# Patient Record
Sex: MALE | Race: OTHER | HISPANIC OR LATINO | URBAN - METROPOLITAN AREA
[De-identification: names, ages, dates, MRNs, and addresses within clinical notes are randomized per-mention and may not be internally consistent; named-entity substitution may affect disease eponyms.]

---

## 2018-07-31 ENCOUNTER — INPATIENT (INPATIENT)
Facility: HOSPITAL | Age: 58
LOS: 1 days | Discharge: HOME | DRG: 897 | End: 2018-08-02
Attending: INTERNAL MEDICINE | Admitting: INTERNAL MEDICINE
Payer: COMMERCIAL

## 2018-07-31 VITALS
OXYGEN SATURATION: 96 % | HEIGHT: 62 IN | RESPIRATION RATE: 20 BRPM | DIASTOLIC BLOOD PRESSURE: 112 MMHG | TEMPERATURE: 98 F | WEIGHT: 160.06 LBS | HEART RATE: 111 BPM | SYSTOLIC BLOOD PRESSURE: 174 MMHG

## 2018-07-31 DIAGNOSIS — E78.5 HYPERLIPIDEMIA, UNSPECIFIED: ICD-10-CM

## 2018-07-31 DIAGNOSIS — E11.9 TYPE 2 DIABETES MELLITUS WITHOUT COMPLICATIONS: ICD-10-CM

## 2018-07-31 DIAGNOSIS — F10.239 ALCOHOL DEPENDENCE WITH WITHDRAWAL, UNSPECIFIED: ICD-10-CM

## 2018-07-31 DIAGNOSIS — F10.230 ALCOHOL DEPENDENCE WITH WITHDRAWAL, UNCOMPLICATED: ICD-10-CM

## 2018-07-31 DIAGNOSIS — Z29.9 ENCOUNTER FOR PROPHYLACTIC MEASURES, UNSPECIFIED: ICD-10-CM

## 2018-07-31 DIAGNOSIS — I10 ESSENTIAL (PRIMARY) HYPERTENSION: ICD-10-CM

## 2018-07-31 LAB
ALBUMIN SERPL ELPH-MCNC: 3.9 G/DL — SIGNIFICANT CHANGE UP (ref 3.5–5)
ALP SERPL-CCNC: 100 U/L — SIGNIFICANT CHANGE UP (ref 40–120)
ALT FLD-CCNC: 61 U/L DA — HIGH (ref 10–60)
ANION GAP SERPL CALC-SCNC: 11 MMOL/L — SIGNIFICANT CHANGE UP (ref 5–17)
APPEARANCE UR: CLEAR — SIGNIFICANT CHANGE UP
AST SERPL-CCNC: 65 U/L — HIGH (ref 10–40)
BASE EXCESS BLDV CALC-SCNC: 5.8 MMOL/L — HIGH (ref -2–2)
BILIRUB DIRECT SERPL-MCNC: 0.2 MG/DL — SIGNIFICANT CHANGE UP (ref 0–0.2)
BILIRUB INDIRECT FLD-MCNC: 0.9 MG/DL — SIGNIFICANT CHANGE UP (ref 0.2–1)
BILIRUB SERPL-MCNC: 1.1 MG/DL — SIGNIFICANT CHANGE UP (ref 0.2–1.2)
BILIRUB UR-MCNC: NEGATIVE — SIGNIFICANT CHANGE UP
BUN SERPL-MCNC: 9 MG/DL — SIGNIFICANT CHANGE UP (ref 7–18)
CALCIUM SERPL-MCNC: 8.9 MG/DL — SIGNIFICANT CHANGE UP (ref 8.4–10.5)
CHLORIDE SERPL-SCNC: 97 MMOL/L — SIGNIFICANT CHANGE UP (ref 96–108)
CO2 SERPL-SCNC: 29 MMOL/L — SIGNIFICANT CHANGE UP (ref 22–31)
COLOR SPEC: YELLOW — SIGNIFICANT CHANGE UP
CREAT SERPL-MCNC: 0.76 MG/DL — SIGNIFICANT CHANGE UP (ref 0.5–1.3)
DIFF PNL FLD: NEGATIVE — SIGNIFICANT CHANGE UP
ETHANOL SERPL-MCNC: <3 MG/DL — SIGNIFICANT CHANGE UP (ref 0–10)
GLUCOSE SERPL-MCNC: 286 MG/DL — HIGH (ref 70–99)
GLUCOSE UR QL: 1000 MG/DL
HCO3 BLDV-SCNC: 32 MMOL/L — HIGH (ref 21–29)
HCT VFR BLD CALC: 47.4 % — SIGNIFICANT CHANGE UP (ref 39–50)
HGB BLD-MCNC: 15.4 G/DL — SIGNIFICANT CHANGE UP (ref 13–17)
HOROWITZ INDEX BLDV+IHG-RTO: 21 — SIGNIFICANT CHANGE UP
KETONES UR-MCNC: ABNORMAL
LACTATE SERPL-SCNC: 1.6 MMOL/L — SIGNIFICANT CHANGE UP (ref 0.7–2)
LACTATE SERPL-SCNC: 4.7 MMOL/L — CRITICAL HIGH (ref 0.7–2)
LEUKOCYTE ESTERASE UR-ACNC: NEGATIVE — SIGNIFICANT CHANGE UP
LIDOCAIN IGE QN: 78 U/L — SIGNIFICANT CHANGE UP (ref 73–393)
MCHC RBC-ENTMCNC: 29 PG — SIGNIFICANT CHANGE UP (ref 27–34)
MCHC RBC-ENTMCNC: 32.5 GM/DL — SIGNIFICANT CHANGE UP (ref 32–36)
MCV RBC AUTO: 89.2 FL — SIGNIFICANT CHANGE UP (ref 80–100)
NITRITE UR-MCNC: NEGATIVE — SIGNIFICANT CHANGE UP
PCO2 BLDV: 56 MMHG — HIGH (ref 35–50)
PH BLDV: 7.38 — SIGNIFICANT CHANGE UP (ref 7.35–7.45)
PH UR: 8 — SIGNIFICANT CHANGE UP (ref 5–8)
PLATELET # BLD AUTO: 145 K/UL — LOW (ref 150–400)
PO2 BLDV: <47 MMHG — HIGH (ref 25–45)
POTASSIUM SERPL-MCNC: 4.8 MMOL/L — SIGNIFICANT CHANGE UP (ref 3.5–5.3)
POTASSIUM SERPL-SCNC: 4.8 MMOL/L — SIGNIFICANT CHANGE UP (ref 3.5–5.3)
PROT SERPL-MCNC: 8.2 G/DL — SIGNIFICANT CHANGE UP (ref 6–8.3)
PROT UR-MCNC: NEGATIVE — SIGNIFICANT CHANGE UP
RBC # BLD: 5.31 M/UL — SIGNIFICANT CHANGE UP (ref 4.2–5.8)
RBC # FLD: 11.5 % — SIGNIFICANT CHANGE UP (ref 10.3–14.5)
SAO2 % BLDV: 14 % — LOW (ref 67–88)
SODIUM SERPL-SCNC: 137 MMOL/L — SIGNIFICANT CHANGE UP (ref 135–145)
SP GR SPEC: 1.01 — SIGNIFICANT CHANGE UP (ref 1.01–1.02)
UROBILINOGEN FLD QL: NEGATIVE — SIGNIFICANT CHANGE UP
WBC # BLD: 15.1 K/UL — HIGH (ref 3.8–10.5)
WBC # FLD AUTO: 15.1 K/UL — HIGH (ref 3.8–10.5)

## 2018-07-31 PROCEDURE — 99223 1ST HOSP IP/OBS HIGH 75: CPT | Mod: AI,GC

## 2018-07-31 PROCEDURE — 74177 CT ABD & PELVIS W/CONTRAST: CPT | Mod: 26

## 2018-07-31 PROCEDURE — 99284 EMERGENCY DEPT VISIT MOD MDM: CPT

## 2018-07-31 PROCEDURE — 71046 X-RAY EXAM CHEST 2 VIEWS: CPT | Mod: 26

## 2018-07-31 RX ORDER — AZITHROMYCIN 500 MG/1
500 TABLET, FILM COATED ORAL ONCE
Qty: 0 | Refills: 0 | Status: COMPLETED | OUTPATIENT
Start: 2018-07-31 | End: 2018-07-31

## 2018-07-31 RX ORDER — AZITHROMYCIN 500 MG/1
TABLET, FILM COATED ORAL
Qty: 0 | Refills: 0 | Status: DISCONTINUED | OUTPATIENT
Start: 2018-07-31 | End: 2018-08-02

## 2018-07-31 RX ORDER — ONDANSETRON 8 MG/1
4 TABLET, FILM COATED ORAL ONCE
Qty: 0 | Refills: 0 | Status: COMPLETED | OUTPATIENT
Start: 2018-07-31 | End: 2018-07-31

## 2018-07-31 RX ORDER — THIAMINE MONONITRATE (VIT B1) 100 MG
500 TABLET ORAL ONCE
Qty: 0 | Refills: 0 | Status: COMPLETED | OUTPATIENT
Start: 2018-07-31 | End: 2018-07-31

## 2018-07-31 RX ORDER — SODIUM CHLORIDE 9 MG/ML
1000 INJECTION, SOLUTION INTRAVENOUS
Qty: 0 | Refills: 0 | Status: DISCONTINUED | OUTPATIENT
Start: 2018-07-31 | End: 2018-08-02

## 2018-07-31 RX ORDER — CEFTRIAXONE 500 MG/1
1 INJECTION, POWDER, FOR SOLUTION INTRAMUSCULAR; INTRAVENOUS ONCE
Qty: 0 | Refills: 0 | Status: COMPLETED | OUTPATIENT
Start: 2018-07-31 | End: 2018-07-31

## 2018-07-31 RX ORDER — INSULIN LISPRO 100/ML
VIAL (ML) SUBCUTANEOUS
Qty: 0 | Refills: 0 | Status: DISCONTINUED | OUTPATIENT
Start: 2018-07-31 | End: 2018-08-01

## 2018-07-31 RX ORDER — THIAMINE MONONITRATE (VIT B1) 100 MG
100 TABLET ORAL DAILY
Qty: 0 | Refills: 0 | Status: DISCONTINUED | OUTPATIENT
Start: 2018-07-31 | End: 2018-08-02

## 2018-07-31 RX ORDER — INSULIN GLARGINE 100 [IU]/ML
6 INJECTION, SOLUTION SUBCUTANEOUS AT BEDTIME
Qty: 0 | Refills: 0 | Status: DISCONTINUED | OUTPATIENT
Start: 2018-07-31 | End: 2018-08-02

## 2018-07-31 RX ORDER — SODIUM CHLORIDE 9 MG/ML
1000 INJECTION INTRAMUSCULAR; INTRAVENOUS; SUBCUTANEOUS ONCE
Qty: 0 | Refills: 0 | Status: COMPLETED | OUTPATIENT
Start: 2018-07-31 | End: 2018-07-31

## 2018-07-31 RX ORDER — FOLIC ACID 0.8 MG
1 TABLET ORAL DAILY
Qty: 0 | Refills: 0 | Status: DISCONTINUED | OUTPATIENT
Start: 2018-07-31 | End: 2018-08-02

## 2018-07-31 RX ORDER — ENOXAPARIN SODIUM 100 MG/ML
40 INJECTION SUBCUTANEOUS DAILY
Qty: 0 | Refills: 0 | Status: DISCONTINUED | OUTPATIENT
Start: 2018-07-31 | End: 2018-08-02

## 2018-07-31 RX ORDER — VANCOMYCIN HCL 1 G
1000 VIAL (EA) INTRAVENOUS ONCE
Qty: 0 | Refills: 0 | Status: DISCONTINUED | OUTPATIENT
Start: 2018-07-31 | End: 2018-07-31

## 2018-07-31 RX ORDER — CEFEPIME 1 G/1
1000 INJECTION, POWDER, FOR SOLUTION INTRAMUSCULAR; INTRAVENOUS ONCE
Qty: 0 | Refills: 0 | Status: DISCONTINUED | OUTPATIENT
Start: 2018-07-31 | End: 2018-07-31

## 2018-07-31 RX ORDER — AZITHROMYCIN 500 MG/1
500 TABLET, FILM COATED ORAL EVERY 24 HOURS
Qty: 0 | Refills: 0 | Status: DISCONTINUED | OUTPATIENT
Start: 2018-08-01 | End: 2018-08-02

## 2018-07-31 RX ORDER — CEFTRIAXONE 500 MG/1
INJECTION, POWDER, FOR SOLUTION INTRAMUSCULAR; INTRAVENOUS
Qty: 0 | Refills: 0 | Status: DISCONTINUED | OUTPATIENT
Start: 2018-07-31 | End: 2018-08-02

## 2018-07-31 RX ORDER — CEFTRIAXONE 500 MG/1
1 INJECTION, POWDER, FOR SOLUTION INTRAMUSCULAR; INTRAVENOUS EVERY 24 HOURS
Qty: 0 | Refills: 0 | Status: DISCONTINUED | OUTPATIENT
Start: 2018-08-01 | End: 2018-08-02

## 2018-07-31 RX ADMIN — SODIUM CHLORIDE 2000 MILLILITER(S): 9 INJECTION INTRAMUSCULAR; INTRAVENOUS; SUBCUTANEOUS at 19:34

## 2018-07-31 RX ADMIN — Medication 2 MILLIGRAM(S): at 13:40

## 2018-07-31 RX ADMIN — SODIUM CHLORIDE 1000 MILLILITER(S): 9 INJECTION INTRAMUSCULAR; INTRAVENOUS; SUBCUTANEOUS at 13:53

## 2018-07-31 RX ADMIN — Medication 105 MILLIGRAM(S): at 19:34

## 2018-07-31 RX ADMIN — SODIUM CHLORIDE 1000 MILLILITER(S): 9 INJECTION INTRAMUSCULAR; INTRAVENOUS; SUBCUTANEOUS at 13:33

## 2018-07-31 RX ADMIN — Medication 50 MILLIGRAM(S): at 13:33

## 2018-07-31 RX ADMIN — Medication 4: at 23:34

## 2018-07-31 RX ADMIN — ONDANSETRON 4 MILLIGRAM(S): 8 TABLET, FILM COATED ORAL at 13:34

## 2018-07-31 RX ADMIN — AZITHROMYCIN 250 MILLIGRAM(S): 500 TABLET, FILM COATED ORAL at 22:21

## 2018-07-31 NOTE — H&P ADULT - NSHPLABSRESULTS_GEN_ALL_CORE
15.4   15.1  )-----------( 145      ( 31 Jul 2018 13:21 )             47.4     07-31    137  |  97  |  9   ----------------------------<  286<H>  4.8   |  29  |  0.76    Ca    8.9      31 Jul 2018 13:21    TPro  8.2  /  Alb  3.9  /  TBili  1.1  /  DBili  0.2  /  AST  65<H>  /  ALT  61<H>  /  AlkPhos  100  07-31      < from: CT Abdomen and Pelvis w/ Oral Cont and w/ IV Cont (07.31.18 @ 16:06) >      LOWER CHEST: Left lower lobe low-density, juxtapleural opacitywith   coarse peripheral calcification measures 2.8 x 2.0 cm.    LIVER: Mild hepatic steatosis.  BILE DUCTS: Normal caliber.  GALLBLADDER: Within normal limits.  SPLEEN: Within normal limits.  PANCREAS: Within normal limits.  ADRENALS: Within normal limits.  KIDNEYS/URETERS: Suggestion of mild heterogeneous enhancement in the   upper pole the right kidney. No fluid collections or hydronephrosis. Left   renal enhancement within normal limits..    BLADDER: Within normal limits.  REPRODUCTIVE ORGANS: The prostate is within normal limits.     BOWEL: Mild colonic diverticulosis. No bowel obstruction.   PERITONEUM: No ascites.  VESSELS:  Within normal limits.  RETROPERITONEUM: No lymphadenopathy.    ABDOMINAL WALL: Small bilateral fat-containing inguinal hernias.  BONES: Mild degenerative changes of the spine.    IMPRESSION: There is suggestion of mild heterogeneity of the upper pole   the right kidney which could represent focal pyelonephritis in the   appropriate clinical setting. Urinalysis correlation is recommended. No   fluid collections.    No acute bowel pathology.    Nonspecific left lower lobe pulmonary opacity may represent a focus of   scarring versus neoplasm. Consider outpatient PET/CT of the chest to   determine if this is metabolically active.    < end of copied text >

## 2018-07-31 NOTE — ED PROVIDER NOTE - MEDICAL DECISION MAKING DETAILS
Patient well appearing. vital sig for tachy and htn. concern for withdrawel vs pancreatitis vs sbo given abd pain  headache resolving, slow progressive in onset, normal neuro, no signs of ich  will obtain lab. fluid, benzo, ct abd given surgical history, and prior history of hosptialization concerning for sbo
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
## 2018-07-31 NOTE — H&P ADULT - PROBLEM SELECTOR PLAN 6
IMPROVE VTE Individual Risk Assessment          RISK                                                          Points  [  ] Previous VTE                                                3  [  ] Thrombophilia                                             2  [  ] Lower limb paralysis                                   2        (unable to hold up >15 seconds)    [  ] Current Cancer                                             2         (within 6 months)  [x  ] Immobilization > 24 hrs                              1  [  ] ICU/CCU stay > 24 hours                             1  [ x ] Age > 60                                                         1    IMPROVE VTE Score: 2   Start with Levonox for dvt ppx

## 2018-07-31 NOTE — H&P ADULT - ASSESSMENT
57 yo male with PMH DM , HTN , HLD , removal of a part of pancreas , comes in with complaint of abdominal pain , dizziness and sweating that started today. Patient describes it as generalized abdominal pain , 8/10 in intensity , with no radiation to back. Also had 8 episodes of NBNP vomiting . Denies any diarrhea , constipation , chest pain ,sob , headache , sick contacts , hospitalizations , dysuria . Admits to drinking alcohol , 4-5 cans of beer a day since 30 years , which sometimes even goes upto 12 cans. CAGE +    In Ed , BP : 174/ 112 , HR : 111 , Temp : 98.2 F   cbc showed , wbc count of 15   bmp wnl   BAL < 3   Lactate of 4 , which normalized after fluid resuscitation   Ct abdomen pelvis shows possible pyelonephritis and LLL opacity ; lipase wnl     Will admit to medicine for concern of alcohol withdrawal and possible pna ?

## 2018-07-31 NOTE — H&P ADULT - PROBLEM SELECTOR PLAN 1
Patient comes in with abdominal pain , sweating , dizziness , with tremors of outstretched hands , concern for withdrawal , as last drink yesterday   Daily alcohol drinker , BAL negative  Will start on CIWA protocol   Fall precautions   Ativan 2 q4 standing , 2 q2 prn   Banana bag   IM thaimine   Folic acid

## 2018-07-31 NOTE — H&P ADULT - NSHPPHYSICALEXAM_GEN_ALL_CORE
GENERAL: lying In bed , comfortable , no distress   HEENT: Normocephalic;  conjunctivae and sclerae clear; dry mucous membranes;   NECK : supple  CHEST/LUNG: Clear to auscultation bilaterally with good air entry   HEART: S1 S2  regular; no murmurs, gallops or rubs  ABDOMEN: Soft, Nontender, Nondistended; Bowel sounds present ; sacr + in mid abdomen 6 *6 , no discharge , erythema / tenderness   EXTREMITIES: no cyanosis; no edema; no calf tenderness ; tremors+ of outstretched hands   SKIN: warm and dry; no rash  NERVOUS SYSTEM:  Awake and alert; Oriented  to place, person and time ; no new deficits

## 2018-07-31 NOTE — H&P ADULT - PROBLEM SELECTOR PLAN 2
Initially on presentation , Lactate of 4 with tachycardia   Concern for sepsis   CT abdomen pelvis showing possible pyelonephritis   UA pending , no CVA tenderness   Also Left lower lobe opacity + , no cough /sob   Elevated white count   Will empirically treat with Rocephin and Zithromax   f/u blood cultures   Monitor for fever , white count

## 2018-07-31 NOTE — ED PROVIDER NOTE - PROGRESS NOTE DETAILS
Patient clinically appear improved. vital improved, culture ordered. cxr order. turned over to sonia for withdrawel and concern for sepsis

## 2018-07-31 NOTE — H&P ADULT - HISTORY OF PRESENT ILLNESS
57 yo male with PMH DM , HTN , HLD , removal of a part of pancreas , comes in with complaint of abdominal pain , dizziness and sweating that started today. Patient describes it as generalized abdominal pain , 8/10 in intensity , with no radiation to back. Also had 8 episodes of NBNP vomiting . Denies any diarrhea , constipation , chest pain ,sob , headache , sick contacts , hospitalizations , dysuria . Admits to drinking alcohol , 4-5 cans of beer a day since 30 years , which sometimes even goes upto 12 cans. CAGE +    In Ed , BP : 174/ 112 , HR : 111 , Temp : 98.2 F   cbc showed , wbc count of 15   bmp wnl   BAL < 3   Lactate of 4 , which normalized after fluid resuscitation   Ct abdomen pelvis shows possible pyelonephritis and LLL opacity

## 2018-07-31 NOTE — ED ADULT NURSE NOTE - ED STAT RN HANDOFF DETAILS
handover patient to Nurse Jiraquele.  IV infusion with Multivitamins in progress at prescribed rate. Patient is being admitted, awaiting bed availability.

## 2018-08-01 DIAGNOSIS — E87.6 HYPOKALEMIA: ICD-10-CM

## 2018-08-01 DIAGNOSIS — E83.39 OTHER DISORDERS OF PHOSPHORUS METABOLISM: ICD-10-CM

## 2018-08-01 DIAGNOSIS — E55.9 VITAMIN D DEFICIENCY, UNSPECIFIED: ICD-10-CM

## 2018-08-01 LAB
24R-OH-CALCIDIOL SERPL-MCNC: 21.8 NG/ML — LOW (ref 30–80)
ANION GAP SERPL CALC-SCNC: 7 MMOL/L — SIGNIFICANT CHANGE UP (ref 5–17)
BASOPHILS # BLD AUTO: 0.1 K/UL — SIGNIFICANT CHANGE UP (ref 0–0.2)
BASOPHILS NFR BLD AUTO: 1.1 % — SIGNIFICANT CHANGE UP (ref 0–2)
BUN SERPL-MCNC: 9 MG/DL — SIGNIFICANT CHANGE UP (ref 7–18)
CALCIUM SERPL-MCNC: 8 MG/DL — LOW (ref 8.4–10.5)
CHLORIDE SERPL-SCNC: 100 MMOL/L — SIGNIFICANT CHANGE UP (ref 96–108)
CHOLEST SERPL-MCNC: 187 MG/DL — SIGNIFICANT CHANGE UP (ref 10–199)
CO2 SERPL-SCNC: 28 MMOL/L — SIGNIFICANT CHANGE UP (ref 22–31)
CREAT SERPL-MCNC: 0.56 MG/DL — SIGNIFICANT CHANGE UP (ref 0.5–1.3)
CULTURE RESULTS: SIGNIFICANT CHANGE UP
EOSINOPHIL # BLD AUTO: 0.4 K/UL — SIGNIFICANT CHANGE UP (ref 0–0.5)
EOSINOPHIL NFR BLD AUTO: 6.4 % — HIGH (ref 0–6)
GLUCOSE SERPL-MCNC: 149 MG/DL — HIGH (ref 70–99)
HBA1C BLD-MCNC: 12.7 % — HIGH (ref 4–5.6)
HCT VFR BLD CALC: 44.2 % — SIGNIFICANT CHANGE UP (ref 39–50)
HDLC SERPL-MCNC: 80 MG/DL — SIGNIFICANT CHANGE UP (ref 40–125)
HGB BLD-MCNC: 14.6 G/DL — SIGNIFICANT CHANGE UP (ref 13–17)
LIPID PNL WITH DIRECT LDL SERPL: 85 MG/DL — SIGNIFICANT CHANGE UP
LYMPHOCYTES # BLD AUTO: 1.2 K/UL — SIGNIFICANT CHANGE UP (ref 1–3.3)
LYMPHOCYTES # BLD AUTO: 19.6 % — SIGNIFICANT CHANGE UP (ref 13–44)
MAGNESIUM SERPL-MCNC: 2 MG/DL — SIGNIFICANT CHANGE UP (ref 1.6–2.6)
MCHC RBC-ENTMCNC: 29.4 PG — SIGNIFICANT CHANGE UP (ref 27–34)
MCHC RBC-ENTMCNC: 32.9 GM/DL — SIGNIFICANT CHANGE UP (ref 32–36)
MCV RBC AUTO: 89.4 FL — SIGNIFICANT CHANGE UP (ref 80–100)
MONOCYTES # BLD AUTO: 0.5 K/UL — SIGNIFICANT CHANGE UP (ref 0–0.9)
MONOCYTES NFR BLD AUTO: 8.1 % — SIGNIFICANT CHANGE UP (ref 2–14)
NEUTROPHILS # BLD AUTO: 4 K/UL — SIGNIFICANT CHANGE UP (ref 1.8–7.4)
NEUTROPHILS NFR BLD AUTO: 64.8 % — SIGNIFICANT CHANGE UP (ref 43–77)
PHOSPHATE SERPL-MCNC: 1.9 MG/DL — LOW (ref 2.5–4.5)
PLATELET # BLD AUTO: 128 K/UL — LOW (ref 150–400)
POTASSIUM SERPL-MCNC: 3.2 MMOL/L — LOW (ref 3.5–5.3)
POTASSIUM SERPL-SCNC: 3.2 MMOL/L — LOW (ref 3.5–5.3)
PROCALCITONIN SERPL-MCNC: 0.07 NG/ML — SIGNIFICANT CHANGE UP (ref 0.02–0.1)
RBC # BLD: 4.94 M/UL — SIGNIFICANT CHANGE UP (ref 4.2–5.8)
RBC # FLD: 11.3 % — SIGNIFICANT CHANGE UP (ref 10.3–14.5)
SODIUM SERPL-SCNC: 135 MMOL/L — SIGNIFICANT CHANGE UP (ref 135–145)
SPECIMEN SOURCE: SIGNIFICANT CHANGE UP
TOTAL CHOLESTEROL/HDL RATIO MEASUREMENT: 2.3 RATIO — LOW (ref 3.4–9.6)
TRIGL SERPL-MCNC: 112 MG/DL — SIGNIFICANT CHANGE UP (ref 10–149)
TSH SERPL-MCNC: 2.69 UU/ML — SIGNIFICANT CHANGE UP (ref 0.34–4.82)
WBC # BLD: 6.2 K/UL — SIGNIFICANT CHANGE UP (ref 3.8–10.5)
WBC # FLD AUTO: 6.2 K/UL — SIGNIFICANT CHANGE UP (ref 3.8–10.5)

## 2018-08-01 PROCEDURE — 99232 SBSQ HOSP IP/OBS MODERATE 35: CPT

## 2018-08-01 RX ORDER — POTASSIUM CHLORIDE 20 MEQ
40 PACKET (EA) ORAL ONCE
Qty: 0 | Refills: 0 | Status: COMPLETED | OUTPATIENT
Start: 2018-08-01 | End: 2018-08-01

## 2018-08-01 RX ORDER — AMLODIPINE BESYLATE 2.5 MG/1
5 TABLET ORAL DAILY
Qty: 0 | Refills: 0 | Status: DISCONTINUED | OUTPATIENT
Start: 2018-08-01 | End: 2018-08-02

## 2018-08-01 RX ORDER — CHOLECALCIFEROL (VITAMIN D3) 125 MCG
1000 CAPSULE ORAL DAILY
Qty: 0 | Refills: 0 | Status: DISCONTINUED | OUTPATIENT
Start: 2018-08-01 | End: 2018-08-02

## 2018-08-01 RX ORDER — INSULIN LISPRO 100/ML
3 VIAL (ML) SUBCUTANEOUS
Qty: 0 | Refills: 0 | Status: DISCONTINUED | OUTPATIENT
Start: 2018-08-01 | End: 2018-08-02

## 2018-08-01 RX ORDER — SODIUM,POTASSIUM PHOSPHATES 278-250MG
1 POWDER IN PACKET (EA) ORAL
Qty: 0 | Refills: 0 | Status: DISCONTINUED | OUTPATIENT
Start: 2018-08-01 | End: 2018-08-02

## 2018-08-01 RX ORDER — INSULIN LISPRO 100/ML
VIAL (ML) SUBCUTANEOUS
Qty: 0 | Refills: 0 | Status: DISCONTINUED | OUTPATIENT
Start: 2018-08-01 | End: 2018-08-02

## 2018-08-01 RX ADMIN — CEFTRIAXONE 100 GRAM(S): 500 INJECTION, POWDER, FOR SOLUTION INTRAMUSCULAR; INTRAVENOUS at 21:42

## 2018-08-01 RX ADMIN — AZITHROMYCIN 250 MILLIGRAM(S): 500 TABLET, FILM COATED ORAL at 21:42

## 2018-08-01 RX ADMIN — Medication 2 MILLIGRAM(S): at 04:00

## 2018-08-01 RX ADMIN — Medication 40 MILLIEQUIVALENT(S): at 17:36

## 2018-08-01 RX ADMIN — SODIUM CHLORIDE 80 MILLILITER(S): 9 INJECTION, SOLUTION INTRAVENOUS at 02:48

## 2018-08-01 RX ADMIN — INSULIN GLARGINE 6 UNIT(S): 100 INJECTION, SOLUTION SUBCUTANEOUS at 22:59

## 2018-08-01 RX ADMIN — Medication 40 MILLIEQUIVALENT(S): at 12:44

## 2018-08-01 RX ADMIN — Medication 1 TABLET(S): at 22:59

## 2018-08-01 RX ADMIN — Medication 1 TABLET(S): at 17:36

## 2018-08-01 RX ADMIN — INSULIN GLARGINE 6 UNIT(S): 100 INJECTION, SOLUTION SUBCUTANEOUS at 00:45

## 2018-08-01 RX ADMIN — Medication 2: at 12:47

## 2018-08-01 RX ADMIN — Medication 4: at 16:17

## 2018-08-01 RX ADMIN — Medication 1 MILLIGRAM(S): at 12:44

## 2018-08-01 RX ADMIN — AMLODIPINE BESYLATE 5 MILLIGRAM(S): 2.5 TABLET ORAL at 23:00

## 2018-08-01 RX ADMIN — Medication 1 TABLET(S): at 14:15

## 2018-08-01 RX ADMIN — CEFTRIAXONE 100 GRAM(S): 500 INJECTION, POWDER, FOR SOLUTION INTRAMUSCULAR; INTRAVENOUS at 00:47

## 2018-08-01 RX ADMIN — Medication 100 MILLIGRAM(S): at 12:44

## 2018-08-01 RX ADMIN — Medication 1: at 07:28

## 2018-08-01 RX ADMIN — ENOXAPARIN SODIUM 40 MILLIGRAM(S): 100 INJECTION SUBCUTANEOUS at 12:48

## 2018-08-01 RX ADMIN — Medication 2 MILLIGRAM(S): at 00:45

## 2018-08-01 NOTE — PROGRESS NOTE ADULT - ATTENDING COMMENTS
Patient was examined in the ED and discussed with COY Sparks.     He is feeling better.  No abdominal pain, no c/o withdrawal symptoms.     Alert, in NAD  Vital Signs Last 24 Hrs  T(C): 36.4 (01 Aug 2018 20:49), Max: 37.1 (01 Aug 2018 02:38)  T(F): 97.5 (01 Aug 2018 20:49), Max: 98.8 (01 Aug 2018 02:38)  HR: 90 (01 Aug 2018 20:49) (75 - 96)  BP: 153/97 (01 Aug 2018 20:49) (138/95 - 163/90)  BP(mean): --  RR: 18 (01 Aug 2018 20:49) (16 - 18)  SpO2: 98% (01 Aug 2018 20:49) (96% - 100%)  Abdomen, soft                        14.6   6.2   )-----------( 128      ( 01 Aug 2018 06:04 )             44.2   08-01    135  |  100  |  9   ----------------------------<  149<H>  3.2<L>   |  28  |  0.56    Ca    8.0<L>      01 Aug 2018 06:04  Phos  1.9     08-01  Mg     2.0     08-01    TPro  8.2  /  Alb  3.9  /  TBili  1.1  /  DBili  0.2  /  AST  65<H>  /  ALT  61<H>  /  AlkPhos  100  07-31    IMP:  EtOH, no signs of withdrawal while on benzodiazepine          No evidence of encephalopathy.           Hypokalemia          DM, suboptimal control  Plan: Supplement potassium,          Ativan PRN, only          Pre-meal insulin coverage and standing.           Social Work consultation Patient was examined in the ED and discussed with COY Sparks.     He is feeling better.  No abdominal pain, no c/o withdrawal symptoms.     Alert, in NAD  Vital Signs Last 24 Hrs  T(C): 36.4 (01 Aug 2018 20:49), Max: 37.1 (01 Aug 2018 02:38)  T(F): 97.5 (01 Aug 2018 20:49), Max: 98.8 (01 Aug 2018 02:38)  HR: 90 (01 Aug 2018 20:49) (75 - 96)  BP: 153/97 (01 Aug 2018 20:49) (138/95 - 163/90)  BP(mean): --  RR: 18 (01 Aug 2018 20:49) (16 - 18)  SpO2: 98% (01 Aug 2018 20:49) (96% - 100%)  Abdomen, soft                        14.6   6.2   )-----------( 128      ( 01 Aug 2018 06:04 )             44.2   08-01    135  |  100  |  9   ----------------------------<  149<H>  3.2<L>   |  28  |  0.56    Ca    8.0<L>      01 Aug 2018 06:04  Phos  1.9     08-01  Mg     2.0     08-01    TPro  8.2  /  Alb  3.9  /  TBili  1.1  /  DBili  0.2  /  AST  65<H>  /  ALT  61<H>  /  AlkPhos  100  07-31    IMP:  EtOH, no signs of withdrawal while on benzodiazepine          No evidence of encephalopathy.           Hypokalemia          DM, suboptimal control          Hypertension, needs better control.   Plan: Supplement potassium,          Ativan PRN, only          Pre-meal insulin coverage and standing.           Initiate anti-hypertensive meds.           Social Work consultation

## 2018-08-02 VITALS
OXYGEN SATURATION: 98 % | TEMPERATURE: 98 F | HEART RATE: 111 BPM | RESPIRATION RATE: 18 BRPM | SYSTOLIC BLOOD PRESSURE: 136 MMHG | DIASTOLIC BLOOD PRESSURE: 99 MMHG

## 2018-08-02 LAB
ANION GAP SERPL CALC-SCNC: 7 MMOL/L — SIGNIFICANT CHANGE UP (ref 5–17)
BUN SERPL-MCNC: 12 MG/DL — SIGNIFICANT CHANGE UP (ref 7–18)
CALCIUM SERPL-MCNC: 9 MG/DL — SIGNIFICANT CHANGE UP (ref 8.4–10.5)
CHLORIDE SERPL-SCNC: 101 MMOL/L — SIGNIFICANT CHANGE UP (ref 96–108)
CO2 SERPL-SCNC: 27 MMOL/L — SIGNIFICANT CHANGE UP (ref 22–31)
CREAT SERPL-MCNC: 0.68 MG/DL — SIGNIFICANT CHANGE UP (ref 0.5–1.3)
GLUCOSE SERPL-MCNC: 220 MG/DL — HIGH (ref 70–99)
HCT VFR BLD CALC: 49 % — SIGNIFICANT CHANGE UP (ref 39–50)
HGB BLD-MCNC: 15.7 G/DL — SIGNIFICANT CHANGE UP (ref 13–17)
MAGNESIUM SERPL-MCNC: 2.2 MG/DL — SIGNIFICANT CHANGE UP (ref 1.6–2.6)
MCHC RBC-ENTMCNC: 28.9 PG — SIGNIFICANT CHANGE UP (ref 27–34)
MCHC RBC-ENTMCNC: 32 GM/DL — SIGNIFICANT CHANGE UP (ref 32–36)
MCV RBC AUTO: 90.3 FL — SIGNIFICANT CHANGE UP (ref 80–100)
PHOSPHATE SERPL-MCNC: 3.1 MG/DL — SIGNIFICANT CHANGE UP (ref 2.5–4.5)
PLATELET # BLD AUTO: 110 K/UL — LOW (ref 150–400)
POTASSIUM SERPL-MCNC: 4.6 MMOL/L — SIGNIFICANT CHANGE UP (ref 3.5–5.3)
POTASSIUM SERPL-SCNC: 4.6 MMOL/L — SIGNIFICANT CHANGE UP (ref 3.5–5.3)
RBC # BLD: 5.42 M/UL — SIGNIFICANT CHANGE UP (ref 4.2–5.8)
RBC # FLD: 11.3 % — SIGNIFICANT CHANGE UP (ref 10.3–14.5)
SODIUM SERPL-SCNC: 135 MMOL/L — SIGNIFICANT CHANGE UP (ref 135–145)
VIT B12 SERPL-MCNC: 1052 PG/ML — SIGNIFICANT CHANGE UP (ref 232–1245)
WBC # BLD: 4.7 K/UL — SIGNIFICANT CHANGE UP (ref 3.8–10.5)
WBC # FLD AUTO: 4.7 K/UL — SIGNIFICANT CHANGE UP (ref 3.8–10.5)

## 2018-08-02 PROCEDURE — 83735 ASSAY OF MAGNESIUM: CPT

## 2018-08-02 PROCEDURE — 82962 GLUCOSE BLOOD TEST: CPT

## 2018-08-02 PROCEDURE — 82607 VITAMIN B-12: CPT

## 2018-08-02 PROCEDURE — 84145 PROCALCITONIN (PCT): CPT

## 2018-08-02 PROCEDURE — 74177 CT ABD & PELVIS W/CONTRAST: CPT

## 2018-08-02 PROCEDURE — 87086 URINE CULTURE/COLONY COUNT: CPT

## 2018-08-02 PROCEDURE — 96372 THER/PROPH/DIAG INJ SC/IM: CPT | Mod: XU

## 2018-08-02 PROCEDURE — 81003 URINALYSIS AUTO W/O SCOPE: CPT

## 2018-08-02 PROCEDURE — 80307 DRUG TEST PRSMV CHEM ANLYZR: CPT

## 2018-08-02 PROCEDURE — 83605 ASSAY OF LACTIC ACID: CPT

## 2018-08-02 PROCEDURE — 99285 EMERGENCY DEPT VISIT HI MDM: CPT | Mod: 25

## 2018-08-02 PROCEDURE — 82306 VITAMIN D 25 HYDROXY: CPT

## 2018-08-02 PROCEDURE — 85027 COMPLETE CBC AUTOMATED: CPT

## 2018-08-02 PROCEDURE — 93005 ELECTROCARDIOGRAM TRACING: CPT

## 2018-08-02 PROCEDURE — 87040 BLOOD CULTURE FOR BACTERIA: CPT

## 2018-08-02 PROCEDURE — 71046 X-RAY EXAM CHEST 2 VIEWS: CPT

## 2018-08-02 PROCEDURE — 82803 BLOOD GASES ANY COMBINATION: CPT

## 2018-08-02 PROCEDURE — 80048 BASIC METABOLIC PNL TOTAL CA: CPT

## 2018-08-02 PROCEDURE — 96374 THER/PROPH/DIAG INJ IV PUSH: CPT

## 2018-08-02 PROCEDURE — 83036 HEMOGLOBIN GLYCOSYLATED A1C: CPT

## 2018-08-02 PROCEDURE — 84100 ASSAY OF PHOSPHORUS: CPT

## 2018-08-02 PROCEDURE — 84443 ASSAY THYROID STIM HORMONE: CPT

## 2018-08-02 PROCEDURE — 83690 ASSAY OF LIPASE: CPT

## 2018-08-02 PROCEDURE — 99239 HOSP IP/OBS DSCHRG MGMT >30: CPT

## 2018-08-02 PROCEDURE — 80076 HEPATIC FUNCTION PANEL: CPT

## 2018-08-02 PROCEDURE — 80061 LIPID PANEL: CPT

## 2018-08-02 RX ORDER — THIAMINE MONONITRATE (VIT B1) 100 MG
1 TABLET ORAL
Qty: 30 | Refills: 0 | OUTPATIENT
Start: 2018-08-02 | End: 2018-08-31

## 2018-08-02 RX ORDER — ENOXAPARIN SODIUM 100 MG/ML
6 INJECTION SUBCUTANEOUS
Qty: 1 | Refills: 0 | OUTPATIENT
Start: 2018-08-02 | End: 2018-08-31

## 2018-08-02 RX ORDER — AZITHROMYCIN 500 MG/1
1 TABLET, FILM COATED ORAL
Qty: 3 | Refills: 0 | OUTPATIENT
Start: 2018-08-02 | End: 2018-08-04

## 2018-08-02 RX ORDER — FOLIC ACID 0.8 MG
1 TABLET ORAL
Qty: 30 | Refills: 0 | OUTPATIENT
Start: 2018-08-02 | End: 2018-08-31

## 2018-08-02 RX ORDER — METFORMIN HYDROCHLORIDE 850 MG/1
500 TABLET ORAL
Qty: 0 | Refills: 0 | COMMUNITY

## 2018-08-02 RX ORDER — METFORMIN HYDROCHLORIDE 850 MG/1
0 TABLET ORAL
Qty: 0 | Refills: 0 | COMMUNITY

## 2018-08-02 RX ORDER — METFORMIN HYDROCHLORIDE 850 MG/1
1 TABLET ORAL
Qty: 60 | Refills: 0 | OUTPATIENT
Start: 2018-08-02 | End: 2018-08-31

## 2018-08-02 RX ORDER — AMLODIPINE BESYLATE 2.5 MG/1
1 TABLET ORAL
Qty: 30 | Refills: 0 | OUTPATIENT
Start: 2018-08-02 | End: 2018-08-31

## 2018-08-02 RX ORDER — CEFUROXIME AXETIL 250 MG
1 TABLET ORAL
Qty: 6 | Refills: 0 | OUTPATIENT
Start: 2018-08-02 | End: 2018-08-04

## 2018-08-02 RX ORDER — CHOLECALCIFEROL (VITAMIN D3) 125 MCG
1000 CAPSULE ORAL
Qty: 30 | Refills: 0 | OUTPATIENT
Start: 2018-08-02 | End: 2018-08-31

## 2018-08-02 RX ORDER — ERGOCALCIFEROL 1.25 MG/1
50000 CAPSULE ORAL
Qty: 0 | Refills: 0 | Status: DISCONTINUED | OUTPATIENT
Start: 2018-08-02 | End: 2018-08-02

## 2018-08-02 RX ADMIN — ERGOCALCIFEROL 50000 UNIT(S): 1.25 CAPSULE ORAL at 12:06

## 2018-08-02 RX ADMIN — Medication 1000 UNIT(S): at 12:05

## 2018-08-02 RX ADMIN — Medication 100 MILLIGRAM(S): at 12:06

## 2018-08-02 RX ADMIN — Medication 3 UNIT(S): at 08:24

## 2018-08-02 RX ADMIN — ENOXAPARIN SODIUM 40 MILLIGRAM(S): 100 INJECTION SUBCUTANEOUS at 12:05

## 2018-08-02 RX ADMIN — Medication 1 MILLIGRAM(S): at 12:05

## 2018-08-02 RX ADMIN — Medication 6: at 12:05

## 2018-08-02 RX ADMIN — Medication 3 UNIT(S): at 12:05

## 2018-08-02 RX ADMIN — AMLODIPINE BESYLATE 5 MILLIGRAM(S): 2.5 TABLET ORAL at 06:25

## 2018-08-02 RX ADMIN — Medication 1 TABLET(S): at 12:05

## 2018-08-02 RX ADMIN — Medication 1 TABLET(S): at 08:24

## 2018-08-02 RX ADMIN — SODIUM CHLORIDE 80 MILLILITER(S): 9 INJECTION, SOLUTION INTRAVENOUS at 06:25

## 2018-08-02 RX ADMIN — Medication 4: at 08:23

## 2018-08-02 NOTE — PROGRESS NOTE ADULT - PROBLEM SELECTOR PLAN 1
CIWA=1.  Standing Ativan dc'd.  C/w Ativan prn   Maintain fall precautions   C/w Banana bag
CIWA=1.  Standing Ativan dc'd.  C/w Ativan prn   Maintain fall precautions   C/w Banana bag

## 2018-08-02 NOTE — DISCHARGE NOTE ADULT - PROVIDER TOKENS
FREE:[LAST:[[facility]],PHONE:[phone]],FAX:[(   [phone]    -],ADDRESS:[[address] [place] [address]]]

## 2018-08-02 NOTE — PROGRESS NOTE ADULT - PROBLEM SELECTOR PLAN 6
Does not remember name of pharmacy  XkDV4Y=90.7  C/w Lantus.  Increased HSS to moderate and added Humalog 3U ac TID.  Continue to monitor FS
Does not remember name of pharmacy  TcGB5U=25.7  C/w Lantus.  Increased HSS to moderate and added Humalog 3U ac TID.  Continue to monitor FS

## 2018-08-02 NOTE — DISCHARGE NOTE ADULT - MEDICATION SUMMARY - MEDICATIONS TO TAKE
I will START or STAY ON the medications listed below when I get home from the hospital:    metFORMIN 500 mg oral tablet  -- 1 tab(s) by mouth 2 times a day   -- Check with your doctor before becoming pregnant.  Do not drink alcoholic beverages when taking this medication.  It is very important that you take or use this exactly as directed.  Do not skip doses or discontinue unless directed by your doctor.  Obtain medical advice before taking any non-prescription drugs as some may affect the action of this medication.  Take with food or milk.    -- Indication: For Diabetes    Lantus Solostar Pen 100 units/mL subcutaneous solution  -- 6 unit(s) subcutaneous once a day (at bedtime)   -- Do not drink alcoholic beverages when taking this medication.  It is very important that you take or use this exactly as directed.  Do not skip doses or discontinue unless directed by your doctor.  Keep in refrigerator.  Do not freeze.    -- Indication: For Diabetes    amLODIPine 5 mg oral tablet  -- 1 tab(s) by mouth once a day  -- Indication: For Hypertension    Ceftin 250 mg oral tablet  -- 1 tab(s) by mouth every 12 hours  -- Indication: For Pneumonia    Azithromycin 3 Day Dose Pack 500 mg oral tablet  -- 1 tab(s) by mouth once a day for 3 days to complete hospital course of 5 days.  -- Do not take dairy products, antacids, or iron preparations within one hour of this medication.  Finish all this medication unless otherwise directed by prescriber.    -- Indication: For Pneumonia    folic acid 1 mg oral tablet  -- 1 tab(s) by mouth once a day  -- Indication: For Alcohol dependence with uncomplicated withdrawal    cholecalciferol oral tablet  -- 1000 unit(s) by mouth once a day  -- Indication: For Vitamin D deficiency    thiamine 100 mg oral tablet  -- 1 tab(s) by mouth once a day   -- Indication: For Alcohol dependence with uncomplicated withdrawal I will START or STAY ON the medications listed below when I get home from the hospital:    metFORMIN 500 mg oral tablet  -- 1 tab(s) by mouth 2 times a day   -- Check with your doctor before becoming pregnant.  Do not drink alcoholic beverages when taking this medication.  It is very important that you take or use this exactly as directed.  Do not skip doses or discontinue unless directed by your doctor.  Obtain medical advice before taking any non-prescription drugs as some may affect the action of this medication.  Take with food or milk.    -- Indication: For Diabetes    Lantus Solostar Pen 100 units/mL subcutaneous solution  -- 6 unit(s) subcutaneous once a day (at bedtime)   -- Do not drink alcoholic beverages when taking this medication.  It is very important that you take or use this exactly as directed.  Do not skip doses or discontinue unless directed by your doctor.  Keep in refrigerator.  Do not freeze.    -- Indication: For Diabetes    amLODIPine 5 mg oral tablet  -- 1 tab(s) by mouth once a day  -- Indication: For Hypertension    folic acid 1 mg oral tablet  -- 1 tab(s) by mouth once a day  -- Indication: For Alcohol dependence with uncomplicated withdrawal    cholecalciferol oral tablet  -- 1000 unit(s) by mouth once a day  -- Indication: For Vitamin D deficiency    thiamine 100 mg oral tablet  -- 1 tab(s) by mouth once a day   -- Indication: For Alcohol dependence with uncomplicated withdrawal

## 2018-08-02 NOTE — DISCHARGE NOTE ADULT - CARE PLAN
Principal Discharge DX:	Sepsis, due to unspecified organism  Secondary Diagnosis:	Alcohol withdrawal syndrome without complication  Secondary Diagnosis:	Diabetes  Secondary Diagnosis:	Hypertension  Secondary Diagnosis:	HLD (hyperlipidemia) Principal Discharge DX:	Alcohol withdrawal  Goal:	treat symptoms  Assessment and plan of treatment:	You were treated for alcohol withdrawal and received multivitamin, ativan, and your electrolytes were supplemented. Follow up with your doctor . You should avoid drinking, counseling provided.  Secondary Diagnosis:	Diabetes  Goal:	Hg A1C less than 7  Assessment and plan of treatment:	Your diabetes is poorly controlled. We started you on long acting insulin. Take this insulin once a day at bedtime. Follow up with your doctor in 1 week.  Secondary Diagnosis:	Hypertension  Goal:	control 130/80  Assessment and plan of treatment:	you were started on blood pressure medication to control your blood pressure. Continue take it once a day. Follow up with your doctor in 1 week.  Secondary Diagnosis:	Sepsis  Goal:	sepsis was ruled out.  Assessment and plan of treatment:	Chest xray did not show any consolidation, blood and urine culture negative.

## 2018-08-02 NOTE — DISCHARGE NOTE ADULT - PATIENT PORTAL LINK FT
You can access the [email or web address] Patient Portal, offered by [facility], by registering with the following website: [email or web address]

## 2018-08-02 NOTE — DISCHARGE NOTE ADULT - HOSPITAL COURSE
57 yo male with PMH DM , HTN , HLD , removal of a part of pancreas , comes in with complaint of abdominal pain , dizziness and sweatingIn Ed , BP : 174/ 112 , HR : 111 , Temp : 98.2 F   cbc showed , wbc count of 15   bmp wnl   BAL < 3   Lactate of 4 , which normalized after fluid resuscitation   Ct abdomen pelvis shows possible pyelonephritis and LLL opacity. Patient admitted for acute alcohol withdrawal, started on CIWA protocol, fall precautions,   Ativan 2 q4 standing  and PRN, given multivitamins, thiamine, folic acid. Required potassium/phosphorus and magnesium supplementation. SW consulted.  - alcohol withdrawal without complication, CIWA score 1 on discharge.   Originally patient presented with trachycardia and elevated lactate. Improved with IV hydration, blood and urine cultures were negative, and sepsis was ruled out.  CT abdomen pelvis showing possible pyelonephritis, however UA was negative and no CVAT on clinical exam. CT also with ?LLL PNA, started on Rocephin and Zithromax,  however no clinical symptoms were noted, cxr was negative for consolidation, antibiotics were discontinued.   Patient with known diabetes on metformin, noted with poorly controlled blood glucose with HgA1C 12.7, started on Lantus and premeal humalog coverage.  Diabetic education done, patient received information about diet, and self management with injections.   PCP referral was advised- information on PMD in patient's preferred geographical location was provided. Scripts sent to pharmacy.   Blood pressure was in poor control, started on amlodipine with much improvement.   Patient is medically stable for discharge home today with outpatient follow up at PMD of choice at [facility].   Stressed importance of etoh avoidance, and DM control including possible complications.

## 2018-08-02 NOTE — DISCHARGE NOTE ADULT - PLAN OF CARE
treat symptoms You were treated for alcohol withdrawal and received multivitamin, ativan, and your electrolytes were supplemented. Follow up with your doctor . You should avoid drinking, counseling provided. Hg A1C less than 7 Your diabetes is poorly controlled. We started you on long acting insulin. Take this insulin once a day at bedtime. Follow up with your doctor in 1 week. control 130/80 you were started on blood pressure medication to control your blood pressure. Continue take it once a day. Follow up with your doctor in 1 week. sepsis was ruled out. Chest xray did not show any consolidation, blood and urine culture negative.

## 2018-08-02 NOTE — PROGRESS NOTE ADULT - ATTENDING COMMENTS
Patient was examined in the ED and discussed with COY Youssef.    He is feeling better.  No abdominal pain, no c/o withdrawal symptoms.  He has had instruction on DM diet and use of insulin.  He prefers not to use insulin, but is willing to try.  He would like to f/u with PCP near where he lives in [facility].     Alert, in NAD    Abdomen, soft    IMP:  EtOH, no signs of withdrawal while on benzodiazepine          No evidence of encephalopathy.           Hypokalemia          DM, suboptimal control, better on insulin          Hypertension, better control  Plan:  Lantus.            Amlodipine          Social Work consultation regarding AA and alcohol treatment programs.           I have given him the contact for [facility], [facility], to which he can walk from his home.

## 2018-08-02 NOTE — PROGRESS NOTE ADULT - PROBLEM SELECTOR PLAN 2
Most likely to GI loss  Supplemented x 2  Repeat BMP in AM
Most likely to GI loss  Supplemented x 2  Repeat BMP in AM

## 2018-08-02 NOTE — PROGRESS NOTE ADULT - PROBLEM SELECTOR PLAN 5
Presumed PNA & Pyleonephritis  Ucx results pending  Bld cx results pending  C/w Rocephin and Zithromax
Presumed PNA & Pyleonephritis  Ucx results pending  Bld cx results pending  C/w Rocephin and Zithromax

## 2018-08-02 NOTE — PROGRESS NOTE ADULT - PROBLEM SELECTOR PLAN 7
BP elevated.  Started Amlodipine 5mg QD  Continue to monitor.
BP elevated.  Started Amlodipine 5mg QD  Continue to monitor.

## 2018-08-02 NOTE — PROGRESS NOTE ADULT - PROBLEM SELECTOR PLAN 4
Most likely d/t poor nutrition  C/w Vitamin D supplements daily
Most likely d/t poor nutrition  C/w Vitamin D supplements daily

## 2018-08-02 NOTE — PROGRESS NOTE ADULT - SUBJECTIVE AND OBJECTIVE BOX
NP Note discussed with  primary attending    58y old Male who presented with abdominal pain, dizziness, and sweating. LOS # 1.  States to "feeling great."  Denies HA, dizziness, SOB, CP or abdominal pain.  LBM today.      INTERVAL HPI/OVERNIGHT EVENTS: no new complaints    MEDICATIONS  (STANDING):  azithromycin  IVPB      azithromycin  IVPB 500 milliGRAM(s) IV Intermittent every 24 hours  cefTRIAXone   IVPB 1 Gram(s) IV Intermittent every 24 hours  cefTRIAXone   IVPB      cholecalciferol 1000 Unit(s) Oral daily  enoxaparin Injectable 40 milliGRAM(s) SubCutaneous daily  folic acid 1 milliGRAM(s) Oral daily  insulin glargine Injectable (LANTUS) 6 Unit(s) SubCutaneous at bedtime  insulin lispro (HumaLOG) corrective regimen sliding scale   SubCutaneous three times a day before meals  insulin lispro Injectable (HumaLOG) 3 Unit(s) SubCutaneous three times a day before meals  multivitamin/thiamine/folic acid in sodium chloride 0.9% 1000 milliLiter(s) (80 mL/Hr) IV Continuous <Continuous>  potassium acid phosphate/sodium acid phosphate tablet (K-PHOS No. 2) 1 Tablet(s) Oral four times a day with meals  potassium chloride    Tablet ER 40 milliEquivalent(s) Oral once  thiamine Injectable 100 milliGRAM(s) IntraMuscular daily    MEDICATIONS  (PRN):  LORazepam   Injectable 2 milliGRAM(s) IV Push every 2 hours PRN Agitation      __________________________________________________  REVIEW OF SYSTEMS:    CONSTITUTIONAL: No fever   EYES: No acute visual disturbances  NECK: No pain or stiffness  RESPIRATORY: No cough; No shortness of breath  CARDIOVASCULAR: No chest pain, no palpitations  GASTROINTESTINAL: No pain. No nausea or vomiting.  No diarrhea   NEUROLOGICAL: No headache or numbness, no tremors  MUSCULOSKELETAL: No joint pain, no muscle pain  GENITOURINARY: No dysuria, no frequency, no hesitancy  PSYCHIATRY: No depression , no anxiety  ALL OTHER  ROS negative        Vital Signs Last 24 Hrs  T(C): 37 (01 Aug 2018 16:33), Max: 37.1 (2018 17:17)  T(F): 98.6 (01 Aug 2018 16:33), Max: 98.8 (01 Aug 2018 02:38)  HR: 86 (01 Aug 2018 16:33) (75 - 104)  BP: 163/90 (01 Aug 2018 16:33) (150/95 - 163/90)  BP(mean): --  RR: 18 (01 Aug 2018 16:33) (16 - 18)  SpO2: 96% (01 Aug 2018 16:33) (96% - 100%)    ________________________________________________  PHYSICAL EXAM:  GENERAL: NAD  HEENT: Normocephalic;  conjunctivae and sclerae clear; moist mucous membranes;   NECK : supple  CHEST/LUNG: Clear to auscultation bilaterally with good air entry   HEART: S1 S2  regular; no murmurs, gallops or rubs  ABDOMEN: Soft, Nontender, Nondistended; Bowel sounds present x 4 quad  EXTREMITIES: No cyanosis; no edema; no calf tenderness.  No tremors noted.  SKIN: Warm and dry; no rash  NERVOUS SYSTEM:  Awake and alert; Oriented  to place, person and time ; no new deficits    _________________________________________________  LABS:                        14.6   6.2   )-----------( 128      ( 01 Aug 2018 06:04 )             44.2     08-    135  |  100  |  9   ----------------------------<  149<H>  3.2<L>   |  28  |  0.56    Ca    8.0<L>      01 Aug 2018 06:04  Phos  1.9     08-  Mg     2.0     08-    TPro  8.2  /  Alb  3.9  /  TBili  1.1  /  DBili  0.2  /  AST  65<H>  /  ALT  61<H>  /  AlkPhos  100  07-31      Urinalysis Basic - ( 2018 19:55 )    Color: Yellow / Appearance: Clear / S.010 / pH: x  Gluc: x / Ketone: Moderate  / Bili: Negative / Urobili: Negative   Blood: x / Protein: Negative / Nitrite: Negative   Leuk Esterase: Negative / RBC: x / WBC x   Sq Epi: x / Non Sq Epi: x / Bacteria: x      CAPILLARY BLOOD GLUCOSE      POCT Blood Glucose.: 341 mg/dL (01 Aug 2018 16:14)  POCT Blood Glucose.: 284 mg/dL (01 Aug 2018 12:32)  POCT Blood Glucose.: 164 mg/dL (01 Aug 2018 07:23)  POCT Blood Glucose.: 305 mg/dL (2018 23:13)        Consultant(s) Notes Reviewed:   YES    Care Discussed with Consultants :     Plan of care was discussed with patient and /or primary care giver; all questions and concerns were addressed and care was aligned with patient's wishes.
MEDICAL ATTENDING NOTE  Patient is a 58y old  Male who presents with a chief complaint of abdominal pain , dizziness , sweating * 1 day (02 Aug 2018 13:48)      HPI:  57 yo male with PMH DM , HTN , HLD , removal of a part of pancreas , comes in with complaint of abdominal pain , dizziness and sweating that started today. Patient describes it as generalized abdominal pain , 8/10 in intensity , with no radiation to back. Also had 8 episodes of NBNP vomiting . Denies any diarrhea , constipation , chest pain ,sob , headache , sick contacts , hospitalizations , dysuria . Admits to drinking alcohol , 4-5 cans of beer a day since 30 years , which sometimes even goes upto 12 cans. CAGE +    In Ed , BP : 174/ 112 , HR : 111 , Temp : 98.2 F   cbc showed , wbc count of 15   bmp wnl   BAL < 3   Lactate of 4 , which normalized after fluid resuscitation   Ct abdomen pelvis shows possible pyelonephritis and LLL opacity (2018 19:12)      INTERVAL HPI/OVERNIGHT EVENTS: no new complaints. Feels wel.    MEDICATIONS  (STANDING):  amLODIPine   Tablet 5 milliGRAM(s) Oral daily  azithromycin  IVPB 500 milliGRAM(s) IV Intermittent every 24 hours  azithromycin  IVPB      cefTRIAXone   IVPB 1 Gram(s) IV Intermittent every 24 hours  cefTRIAXone   IVPB      cholecalciferol 1000 Unit(s) Oral daily  enoxaparin Injectable 40 milliGRAM(s) SubCutaneous daily  ergocalciferol 21764 Unit(s) Oral <User Schedule>  folic acid 1 milliGRAM(s) Oral daily  insulin glargine Injectable (LANTUS) 6 Unit(s) SubCutaneous at bedtime  insulin lispro (HumaLOG) corrective regimen sliding scale   SubCutaneous three times a day before meals  insulin lispro Injectable (HumaLOG) 3 Unit(s) SubCutaneous three times a day before meals  multivitamin/thiamine/folic acid in sodium chloride 0.9% 1000 milliLiter(s) (80 mL/Hr) IV Continuous <Continuous>  potassium acid phosphate/sodium acid phosphate tablet (K-PHOS No. 2) 1 Tablet(s) Oral four times a day with meals  thiamine Injectable 100 milliGRAM(s) IntraMuscular daily    MEDICATIONS  (PRN):  LORazepam   Injectable 2 milliGRAM(s) IV Push every 2 hours PRN Agitation      __________________________________________________  REVIEW OF SYSTEMS:    CONSTITUTIONAL: No fever,   EYES: no acute visual disturbances  NECK: No pain or stiffness  RESPIRATORY: No cough; No shortness of breath  CARDIOVASCULAR: No chest pain, no palpitations  GASTROINTESTINAL: No pain. No nausea or vomiting; No diarrhea   NEUROLOGICAL: No headache or numbness, no tremors  MUSCULOSKELETAL: No joint pain, no muscle pain  GENITOURINARY: no dysuria, no frequency, no hesitancy  PSYCHIATRY: no depression , no anxiety  ALL OTHER  ROS negative        Vital Signs Last 24 Hrs  T(C): 36.8 (02 Aug 2018 12:53), Max: 36.8 (02 Aug 2018 12:53)  T(F): 98.3 (02 Aug 2018 12:53), Max: 98.3 (02 Aug 2018 12:53)  HR: 111 (02 Aug 2018 12:53) (61 - 111)  BP: 136/99 (02 Aug 2018 12:53) (126/74 - 155/105)  BP(mean): --  RR: 18 (02 Aug 2018 12:53) (16 - 18)  SpO2: 98% (02 Aug 2018 12:53) (98% - 98%)    ________________________________________________  PHYSICAL EXAM:  GENERAL: NAD  HEENT: Normocephalic;  conjunctivae and sclerae clear; moist mucous membranes;   NECK : supple  CHEST/LUNG: Clear to auscultation bilaterally with good air entry   HEART: S1 S2  regular; no murmurs, gallops or rubs  ABDOMEN: Soft, Nontender, Nondistended; Bowel sounds present  EXTREMITIES: no cyanosis; no edema; no calf tenderness  SKIN: warm and dry; no rash  NERVOUS SYSTEM:  Awake and alert; Oriented  to place, person and time ; no new deficits    _________________________________________________  LABS:                        15.7   4.7   )-----------( 110      ( 02 Aug 2018 07:34 )             49.0     08-02    135  |  101  |  12  ----------------------------<  220<H>  4.6   |  27  |  0.68    Ca    9.0      02 Aug 2018 07:34  Phos  3.1     08-02  Mg     2.2     08-02        Urinalysis Basic - ( 2018 19:55 )    Color: Yellow / Appearance: Clear / S.010 / pH: x  Gluc: x / Ketone: Moderate  / Bili: Negative / Urobili: Negative   Blood: x / Protein: Negative / Nitrite: Negative   Leuk Esterase: Negative / RBC: x / WBC x   Sq Epi: x / Non Sq Epi: x / Bacteria: x      CAPILLARY BLOOD GLUCOSE      POCT Blood Glucose.: 277 mg/dL (02 Aug 2018 11:32)  POCT Blood Glucose.: 247 mg/dL (02 Aug 2018 07:57)  POCT Blood Glucose.: 354 mg/dL (01 Aug 2018 22:13)

## 2018-08-02 NOTE — DIETITIAN INITIAL EVALUATION ADULT. - OTHER INFO
Nutrition assessment for HgbA1c: 12.7. Pt w/ inconsistent story (wife and pt w/ different answers). Per pt, he has good appetite while in hospital, occasionally at home does not feel like eating. UBW: 180 lbs (4-5 months ago); now 160 lbs. 11% wt loss x 5months. Admitted w/ possible alcohol withdrawal. Denies GI distress/chewing swallowing difficulty. Pt w/ hx of DM (per chart, has had partial removal of pancreas), does not follow DM diet at home. Provided pt w/ DM diet education. Skin intact, no edema.

## 2018-08-02 NOTE — PROGRESS NOTE ADULT - PROBLEM SELECTOR PLAN 3
Most likely d/t poor nutrition  Supplemented x 2 days.  Reevaluate based on labs.  Repeat P in AM
Most likely d/t poor nutrition  Supplemented x 2 days.  Reevaluate based on labs.  Repeat P in AM

## 2018-08-02 NOTE — DISCHARGE NOTE ADULT - CARE PROVIDER_API CALL
[facility],   [address]  [place] [address]  Phone: [phone]  Fax: (   )    -

## 2018-08-06 LAB
CULTURE RESULTS: SIGNIFICANT CHANGE UP
CULTURE RESULTS: SIGNIFICANT CHANGE UP
SPECIMEN SOURCE: SIGNIFICANT CHANGE UP
SPECIMEN SOURCE: SIGNIFICANT CHANGE UP

## 2019-08-27 NOTE — ED ADULT NURSE NOTE - NSFALLRSKHARMRISK_ED_ALL_ED
TRANSFER - OUT REPORT:    Verbal report given to Marlene MEJIA(name) on David Terry  being transferred to Surgical (unit) for routine progression of care       Report consisted of patients Situation, Background, Assessment and   Recommendations(SBAR). Information from the following report(s) SBAR, Kardex, ED Summary, [facility] and Recent Results was reviewed with the receiving nurse. Lines:   Peripheral IV 08/26/19 Left Antecubital (Active)   Site Assessment Clean, dry, & intact 8/26/2019 11:19 PM   Phlebitis Assessment 0 8/26/2019 11:19 PM   Infiltration Assessment 0 8/26/2019 11:19 PM   Dressing Status Clean, dry, & intact 8/26/2019 11:19 PM   Dressing Type Transparent 8/26/2019 11:19 PM        Opportunity for questions and clarification was provided.       Patient transported with:   [employer] no

## 2023-01-18 NOTE — ED PROVIDER NOTE - NS ED ATTENDING STATEMENT MOD
What Type Of Note Output Would You Prefer (Optional)?: Bullet Format How Severe Are Your Spot(S)?: moderate Have Your Spot(S) Been Treated In The Past?: has not been treated Hpi Title: Evaluation of Skin Lesions Attending Only

## 2024-08-12 NOTE — ED PROVIDER NOTE - CARE PLAN
Sarbjit Xiao,   I saw patient in OMT clinic this week, they can return in 2 weeks for another OMT session.   Thanks!  
Principal Discharge DX:	Alcohol withdrawal syndrome without complication  Secondary Diagnosis:	Sepsis, due to unspecified organism